# Patient Record
Sex: MALE | Race: WHITE
[De-identification: names, ages, dates, MRNs, and addresses within clinical notes are randomized per-mention and may not be internally consistent; named-entity substitution may affect disease eponyms.]

---

## 2019-09-03 ENCOUNTER — HOSPITAL ENCOUNTER (EMERGENCY)
Dept: HOSPITAL 97 - ER | Age: 49
Discharge: HOME | End: 2019-09-03
Payer: COMMERCIAL

## 2019-09-03 DIAGNOSIS — K42.9: Primary | ICD-10-CM

## 2019-09-03 LAB
ALBUMIN SERPL BCP-MCNC: 3.9 G/DL (ref 3.4–5)
ALP SERPL-CCNC: 91 U/L (ref 45–117)
ALT SERPL W P-5'-P-CCNC: 47 U/L (ref 12–78)
AST SERPL W P-5'-P-CCNC: 23 U/L (ref 15–37)
BUN BLD-MCNC: 17 MG/DL (ref 7–18)
GLUCOSE SERPLBLD-MCNC: 136 MG/DL (ref 74–106)
HCT VFR BLD CALC: 47.5 % (ref 39.6–49)
LIPASE SERPL-CCNC: 99 U/L (ref 73–393)
LYMPHOCYTES # SPEC AUTO: 1.2 K/UL (ref 0.7–4.9)
PMV BLD: 9.4 FL (ref 7.6–11.3)
POTASSIUM SERPL-SCNC: 4.2 MMOL/L (ref 3.5–5.1)
RBC # BLD: 5.25 M/UL (ref 4.33–5.43)

## 2019-09-03 PROCEDURE — 74177 CT ABD & PELVIS W/CONTRAST: CPT

## 2019-09-03 PROCEDURE — 83690 ASSAY OF LIPASE: CPT

## 2019-09-03 PROCEDURE — 85025 COMPLETE CBC W/AUTO DIFF WBC: CPT

## 2019-09-03 PROCEDURE — 99284 EMERGENCY DEPT VISIT MOD MDM: CPT

## 2019-09-03 PROCEDURE — 80048 BASIC METABOLIC PNL TOTAL CA: CPT

## 2019-09-03 PROCEDURE — 96374 THER/PROPH/DIAG INJ IV PUSH: CPT

## 2019-09-03 PROCEDURE — 36415 COLL VENOUS BLD VENIPUNCTURE: CPT

## 2019-09-03 PROCEDURE — 80076 HEPATIC FUNCTION PANEL: CPT

## 2019-09-03 NOTE — ER
Nurse's Notes                                                                                     

 [facility]                                                                 

Name: Danny Duarte                                                                                   

Age: 49 yrs                                                                                       

Sex: Male                                                                                         

: 1970                                                                                   

MRN: [id number]                                                                                   

Arrival Date: 2019                                                                          

Time: 09:44                                                                                       

Account#: [id number]                                                                            

Bed 27                                                                                            

Private MD:                                                                                       

Diagnosis: Umbilical hernia without obstruction or gangrene                                       

                                                                                                  

Presentation:                                                                                     

                                                                                             

09:46 Presenting complaint: Worsening LLQ pain after lifting heavy equipment 1 month ago. Pt  hb  

      was told he has a hernia, unable to follow up with surgeon. Transition of care: patient     

      was not received from another setting of care. Onset of symptoms was 2019. Risk      

      Assessment: Do you want to hurt yourself or someone else? Patient reports no desire to      

      harm self or others. Initial Sepsis Screen: Does the patient meet any 2 criteria? No.       

      Patient's initial sepsis screen is negative. Does the patient have a suspected source       

      of infection? No. Patient's initial sepsis screen is negative. Care prior to arrival:       

      None.                                                                                       

09:46 Method Of Arrival: Ambulatory                                                           hb  

09:46 Acuity: CHRIS 3                                                                           hb  

                                                                                                  

Historical:                                                                                       

- Allergies:                                                                                      

09:49 No Known Allergies;                                                                     hb  

- PMHx:                                                                                           

09:49 arrythmias;                                                                             hb  

- PSHx:                                                                                           

09:49 None;                                                                                   hb  

                                                                                                  

- Immunization history:: Adult Immunizations up to date.                                          

- Social history:: Smoking status: Patient/guardian denies using tobacco.                         

- Ebola Screening: : No symptoms or risks identified at this time.                                

                                                                                                  

                                                                                                  

Screening:                                                                                        

10:17 Abuse screen: Denies threats or abuse. Denies injuries from another. Nutritional        aj1 

      screening: No deficits noted. Tuberculosis screening: No symptoms or risk factors           

      identified.                                                                                 

14:08 Fall Risk None identified.                                                              rv  

                                                                                                  

Assessment:                                                                                       

10:17 General: Appears in no apparent distress. uncomfortable, Behavior is calm, cooperative, aj1 

      appropriate for age. Pain: Complains of pain in left lower quadrant Pain does not           

      radiate. Pain currently is 7 out of 10 on a pain scale. Neuro: Level of Consciousness       

      is awake, alert, obeys commands, Oriented to person, place, time, situation.                

      Cardiovascular: Patient's skin is warm and dry. Respiratory: Airway is patent               

      Respiratory effort is even, unlabored, Respiratory pattern is regular, symmetrical. GI:     

      Abdomen is non-distended, Bowel sounds present X 4 quads. Abd is soft and non tender X      

      4 quads. : No signs and/or symptoms were reported regarding the genitourinary system.     

      EENT: No signs and/or symptoms were reported regarding the EENT system. Derm: No signs      

      and/or symptoms reported regarding the dermatologic system. Skin is pink, warm \T\ dry.     

      normal. Musculoskeletal: No signs and/or symptoms reported regarding the                    

      musculoskeletal system. Circulation, motion, and sensation intact.                          

11:00 Reassessment: Patient appears in no apparent distress at this time. No changes from     aj1 

      previously documented assessment. Patient and/or family updated on plan of care and         

      expected duration. Pain level reassessed. Patient is alert, oriented x 3, equal             

      unlabored respirations, skin warm/dry/pink.                                                 

12:25 Reassessment: Patient appears in no apparent distress at this time. No changes from     aj1 

      previously documented assessment. Patient and/or family updated on plan of care and         

      expected duration. Pain level reassessed. Patient is alert, oriented x 3, equal             

      unlabored respirations, skin warm/dry/pink.                                                 

                                                                                                  

Vital Signs:                                                                                      

09:49  / 98; Pulse 87; Resp 16; Temp 97.8; Pulse Ox 100% on R/A; Weight 108.86 kg;      hb  

      Height 5 ft. 10 in. (177.80 cm); Pain 7/10;                                                 

11:00  / 100; Pulse 66; Resp 18; Pulse Ox 96% on R/A;                                   aj1 

12:25  / 104; Pulse 66; Resp 18; Pulse Ox 97% on R/A;                                   aj1 

13:40  / 104; Pulse 64; Resp 18; Pulse Ox 96% on R/A;                                   aj1 

09:49 Body Mass Index 34.44 (108.86 kg, 177.80 cm)                                              

                                                                                                  

ED Course:                                                                                        

09:44 Patient arrived in ED.                                                                  as  

09:49 Triage completed.                                                                       hb  

09:49 Arm band placed on.                                                                     hb  

09:51 Margot Valdes, RN is Primary Nurse.                                                   aj1 

09:52 Eric Negrete NP is PHCP.                                                           pm1 

09:52 Hudson Piper MD is Attending Physician.                                             pm1 

10:17 Patient has correct armband on for positive identification. Bed in low position. Call   aj1 

      light in reach. Side rails up X 1.                                                          

10:17 No provider procedures requiring assistance completed.                                  aj1 

10:18 Radiology exam delayed due to lab results not completed at this time. (BUN/Creatinine).   

10:46 Initial lab(s) drawn, by me, sent to lab. Inserted saline lock: 20 gauge in right       aj1 

      antecubital area, using aseptic technique. Blood collected.                                 

13:54 Jani Foley MD is Referral Physician.                                                 pm1 

14:08 IV discontinued, intact, bleeding controlled, No redness/swelling at site. Pressure     rv  

      dressing applied.                                                                           

                                                                                                  

Administered Medications:                                                                         

14:05 Drug: Flexeril 10 mg Route: PO;                                                         rv  

14:07 Follow up: Response: Medication administered at discharge.                              rv  

14:05 Drug: TORadol 30 mg Route: IVP; Site: right antecubital;                                rv  

14:07 Follow up: Response: Medication administered at discharge.                              rv  

                                                                                                  

                                                                                                  

Outcome:                                                                                          

13:46 Discharge ordered by MD.                                                                pm1 

14:08 Discharged to home ambulatory.                                                          rv  

14:08 Condition: good                                                                             

14:08 Discharge instructions given to patient, Instructed on discharge instructions, follow       

      up and referral plans. medication usage, Demonstrated understanding of instructions,        

      follow-up care, medications, Prescriptions given X 2.                                       

14:09 Patient left the ED.                                                                    rv  

                                                                                                  

Signatures:                                                                                       

Margot Valdes, RN                     RN   aj1                                                  

Nell Barreto Shannon                                                                                 

Eric Negrete, NP                    NP   pm1                                                  

Thao Castaneda, Umesh Vaughan RN, RN                    RN   rv                                                   

                                                                                                  

**************************************************************************************************

## 2019-09-03 NOTE — EDPHYS
Physician Documentation                                                                           

 [facility]                                                                 

Name: Danny Duarte                                                                                   

Age: 49 yrs                                                                                       

Sex: Male                                                                                         

: 1970                                                                                   

MRN: [id number]                                                                                   

Arrival Date: 2019                                                                          

Time: 09:44                                                                                       

Account#: [id number]                                                                            

Bed 27                                                                                            

Private MD:                                                                                       

[facility] Physician Hudson Piper                                                                      

HPI:                                                                                              

                                                                                             

13:34 This 49 yrs old  Male presents to ER via Ambulatory with complaints of Pain-   pm1 

      Hernia.                                                                                     

13:34 The patient presents with abdominal pain in the left lower quadrant. Onset: The         pm1 

      symptoms/episode began/occurred 1 month(s) ago. The symptoms do not radiate. Associated     

      signs and symptoms: Pertinent negatives: nausea, vomiting, and diarrhea, chest pain,        

      shortness of breath, testicular pain. The symptoms are described as achy. Modifying         

      factors: The symptoms are alleviated by rest. the symptoms are aggravated by Pushing        

      heavy objects. Patient does not wear safety belt during work. Severity of pain: in the      

      emergency department the pain is unchanged. The patient has been recently seen at an        

      urgent care, last week, for similar complaints, Told that he has a hernia.                  

                                                                                                  

Historical:                                                                                       

- Allergies:                                                                                      

09:49 No Known Allergies;                                                                     hb  

- PMHx:                                                                                           

09:49 arrythmias;                                                                             hb  

- PSHx:                                                                                           

09:49 None;                                                                                   hb  

                                                                                                  

- Immunization history:: Adult Immunizations up to date.                                          

- Social history:: Smoking status: Patient/guardian denies using tobacco.                         

- Ebola Screening: : No symptoms or risks identified at this time.                                

                                                                                                  

                                                                                                  

ROS:                                                                                              

13:34 Constitutional: Negative for fever, chills, and weight loss, Eyes: Negative for injury, pm1 

      pain, redness, and discharge, ENT: Negative for injury, pain, and discharge, Neck:          

      Negative for injury, pain, and swelling, Cardiovascular: Negative for chest pain,           

      palpitations, and edema, Respiratory: Negative for shortness of breath, cough,              

      wheezing, and pleuritic chest pain, Back: Negative for injury and pain.                     

13:34 : Negative for injury, bleeding, discharge, and swelling, MS/Extremity: Negative for      

      injury and deformity, Skin: Negative for injury, rash, and discoloration, Neuro:            

      Negative for headache, weakness, numbness, tingling, and seizure.                           

13:34 Abdomen/GI: Positive for abdominal pain, Negative for nausea, vomiting, and diarrhea,       

      constipation.                                                                               

                                                                                                  

Exam:                                                                                             

10:30 Constitutional:  This is a well developed, well nourished patient who is awake, alert,  pm1 

      and in no acute distress. Head/Face:  Normocephalic, atraumatic. Eyes:  Pupils equal        

      round and reactive to light, extra-ocular motions intact.  Lids and lashes normal.          

      Conjunctiva and sclera are non-icteric and not injected.  Cornea within normal limits.      

      Periorbital areas with no swelling, redness, or edema. ENT:  Nares patent. No nasal         

      discharge, no septal abnormalities noted.  Tympanic membranes are normal and external       

      auditory canals are clear.  Oropharynx with no redness, swelling, or masses, exudates,      

      or evidence of obstruction, uvula midline.  Mucous membranes moist. Neck:  Trachea          

      midline, no thyromegaly or masses palpated, and no cervical lymphadenopathy.  Supple,       

      full range of motion without nuchal rigidity, or vertebral point tenderness.  No            

      Meningismus. Chest/axilla:  Normal chest wall appearance and motion.  Nontender with no     

      deformity.  No lesions are appreciated. Cardiovascular:  Regular rate and rhythm with a     

      normal S1 and S2.  No gallops, murmurs, or rubs.  Normal PMI, no JVD.  No pulse             

      deficits. Respiratory:  Lungs have equal breath sounds bilaterally, clear to                

      auscultation and percussion.  No rales, rhonchi or wheezes noted.  No increased work of     

      breathing, no retractions or nasal flaring.                                                 

10:30 Back:  No spinal tenderness.  No costovertebral tenderness.  Full range of motion.          

      Skin:  Warm, dry with normal turgor.  Normal color with no rashes, no lesions, and no       

      evidence of cellulitis. MS/ Extremity:  Pulses equal, no cyanosis.  Neurovascular           

      intact.  Full, normal range of motion.                                                      

10:30 Abdomen/GI: Inspection: obese Bowel sounds: normal, Palpation: abdomen is soft and          

      non-tender, mass, is not appreciated, rebound tenderness, is not appreciated, Hernia:       

      not appreciated.                                                                            

10:30 Neuro: Orientation: is normal, Motor: is normal, moves all fours.                           

                                                                                                  

Vital Signs:                                                                                      

09:49  / 98; Pulse 87; Resp 16; Temp 97.8; Pulse Ox 100% on R/A; Weight 108.86 kg;      hb  

      Height 5 ft. 10 in. (177.80 cm); Pain 7/10;                                                 

11:00  / 100; Pulse 66; Resp 18; Pulse Ox 96% on R/A;                                   aj1 

12:25  / 104; Pulse 66; Resp 18; Pulse Ox 97% on R/A;                                   aj1 

13:40  / 104; Pulse 64; Resp 18; Pulse Ox 96% on R/A;                                   aj1 

09:49 Body Mass Index 34.44 (108.86 kg, 177.80 cm)                                            hb  

                                                                                                  

MDM:                                                                                              

09:52 Patient medically screened.                                                             pm1 

13:45 Data reviewed: vital signs. Data interpreted: Pulse oximetry: on room air is 96 %.      pm1 

      Interpretation: normal. Counseling: I had a detailed discussion with the patient and/or     

      guardian regarding: the historical points, exam findings, and any diagnostic results        

      supporting the discharge/admit diagnosis, lab results, radiology results, the need for      

      outpatient follow up, for definitive care, a general surgeon, to return to the              

      emergency department if symptoms worsen or persist or if there are any questions or         

      concerns that arise at home.                                                                

                                                                                                  

                                                                                             

10:13 Order name: Basic Metabolic Panel; Complete Time: 11:25                                 pm1 

                                                                                             

10:13 Order name: CBC with Diff; Complete Time: 11:04                                         pm                                                                                             

10:13 Order name: Creatinine for Radiology; Complete Time: 11:25                              pm1 

                                                                                             

10:13 Order name: Hepatic Function; Complete Time: 11:25                                      pm1 

                                                                                             

10:13 Order name: Lipase; Complete Time: 11:25                                                pm1 

                                                                                             

10:13 Order name: CT Abd/Pelvis - IV Contrast Only                                            pm1 

                                                                                             

10:13 Order name: IV Saline Lock; Complete Time: 10:45                                        pm1 

                                                                                             

10:13 Order name: Labs collected and sent; Complete Time: 10:45                               pm1 

                                                                                                  

Administered Medications:                                                                         

14:05 Drug: Flexeril 10 mg Route: PO;                                                         rv  

14:07 Follow up: Response: Medication administered at discharge.                              rv  

14:05 Drug: TORadol 30 mg Route: IVP; Site: right antecubital;                                rv  

14:07 Follow up: Response: Medication administered at discharge.                              rv  

                                                                                                  

                                                                                                  

Disposition:                                                                                      

15:35 Co-signature as Attending Physician, Hudson Piper MD I agree with the assessment and  janie 

      plan of care.                                                                               

                                                                                                  

Disposition:                                                                                      

19 13:46 Discharged to Home. Impression: Umbilical hernia without obstruction or            

  gangrene.                                                                                       

- Condition is Stable.                                                                            

- Discharge Instructions: Hernia, Adult, Muscle Strain.                                           

- Prescriptions for Naprosyn 500 mg Oral Tablet - take 1 tablet by ORAL route 2 times             

  per day take with food; 30 tablet. Cyclobenzaprine 10 mg Oral Tablet - take 1 tablet            

  by ORAL route every 8 hours As needed; 30 tablet.                                               

- Medication Reconciliation Form, Thank You Letter, Antibiotic Education, Prescription            

  Opioid Use form.                                                                                

- Work release form (19 08:28).                                                         em1 

- Follow up: Emergency Department; When: As needed; Reason: Worsening of condition.               

  Follow up: Private Physician; When: 2 - 3 days; Reason: Recheck today's complaints,             

  Continuance of care, Re-evaluation by your physician. Follow up: Jani Foley MD;              

  When: 2 - 3 days; Reason: Recheck today's complaints, Continuance of care,                      

  Re-evaluation by your physician.                                                                

- Problem is new.                                                                                 

- Symptoms have improved.                                                                         

                                                                                                  

                                                                                                  

                                                                                                  

Signatures:                                                                                       

Dispatcher [employer]                           EDMS                                                 

Hudson Piper MD MD cha Marinas, Patrick, NP                    NP   pm1                                                  

Thao Castaneda RN                     RN   Umesh Moreno RN RN rv Martinez, Eric                               em1                                                  

                                                                                                  

Corrections: (The following items were deleted from the chart)                                    

13:54 13:46 2019 13:46 Discharged to Home. Impression: Umbilical hernia without         pm1 

      obstruction or gangrene. Condition is Stable. Forms are Medication Reconciliation Form,     

      Thank You Letter, Antibiotic Education, Prescription Opioid Use. Follow up: Emergency       

      Department; When: As needed; Reason: Worsening of condition. Follow up: Private             

      Physician; When: 2 - 3 days; Reason: Recheck today's complaints, Continuance of care,       

      Re-evaluation by your physician. Problem is new. Symptoms have improved. pm1                

14:09 13:54 2019 13:46 Discharged to Home. Impression: Umbilical hernia without         rv  

      obstruction or gangrene. Condition is Stable. Discharge Instructions: Hernia, Adult,        

      Muscle Strain. Forms are Medication Reconciliation Form, Thank You Letter, Antibiotic       

      Education, Prescription Opioid Use. Follow up: Emergency Department; When: As needed;       

      Reason: Worsening of condition. Follow up: Private Physician; When: 2 - 3 days; Reason:     

      Recheck today's complaints, Continuance of care, Re-evaluation by your physician.           

      Follow up: Jani Foley; When: 2 - 3 days; Reason: Recheck today's complaints,               

      Continuance of care, Re-evaluation by your physician. Problem is new. Symptoms have         

      improved. pm1                                                                               

                                                                                                  

**************************************************************************************************

## 2019-09-03 NOTE — RAD REPORT
EXAM DESCRIPTION:  CT - Abdomen   Pelvis W Contrast - 9/3/2019 11:37 am

 

CLINICAL HISTORY:  Abdominal pain/left upper quadrant pain

 

COMPARISON:  none.

 

TECHNIQUE:  Computed axial tomography of the abdomen pelvis was obtained. 100 cc Isovue-300 was admin
istered intravenously. Oral contrast was not requested which limits evaluation of bowel.

 

All CT scans are performed using dose optimization technique as appropriate and may include automated
 exposure control or mA/KV adjustment according to patient size.

 

FINDINGS:  Fatty liver. Spleen, pancreas, adrenal and kidneys appear unremarkable.

 

There is no evidence of diverticulitis. Normal appendix

 

Umbilical hernia contains fat. Mild stranding is present within the fat. This may indicate mild infla
mmation. Neck measures 23 millimeters.

 

Small right inguinal hernia

 

IMPRESSION:  Umbilical hernia

## 2020-12-03 NOTE — RAD REPORT
EXAM DESCRIPTION:  RAD - Knee Right 3 View - 12/3/2020 10:11 am

 

CLINICAL HISTORY:  PAIN

 

COMPARISON:  <Comparisons>

 

FINDINGS:  No fracture, dislocation or periosteal reaction.No joint effusion seen. No joint space colleen
rowing. No foreign body or other soft tissue abnormality.

 

 

IMPRESSION:  Negative right knee.

 

Clinical concerns for internal derangement or occult bony injury could be further assessed with MR im
aging.

## 2020-12-03 NOTE — EDPHYS
Physician Documentation                                                                           

 [facility]                                                                 

Name: Danny Duarte                                                                                   

Age: 50 yrs                                                                                       

Sex: Male                                                                                         

: 1970                                                                                   

MRN: [id number]                                                                                   

Arrival Date: 2020                                                                          

Time: 09:22                                                                                       

Account#: [id number]                                                                            

Bed 14                                                                                            

Private MD:                                                                                       

ED Physician Jason Worley                                                                       

HPI:                                                                                              

                                                                                             

09:40 This 50 yrs old  Male presents to ER via Ambulatory with complaints of Hip     cp  

      Problem.                                                                                    

09:40 The patient presents with pain, that is chronic.                                        cp  

09:40 The complaints affect the right knee and right hip. Context: the patient can fully bear cp  

      weight, the patient is able to ambulate, with mild difficulty.                              

09:40 Patient reports chronic right SI joint pain that became worse 2-3 days ago after        cp  

      installing new well pump. Now having pain of right hip and right knee.                      

                                                                                                  

Historical:                                                                                       

- Allergies:                                                                                      

09:30 No Known Allergies;                                                                     aa5 

- PMHx:                                                                                           

09:30 Heart arrhythmias;                                                                      aa5 

- PSHx:                                                                                           

09:30 None;                                                                                   aa5 

                                                                                                  

- Immunization history:: Adult Immunizations unknown.                                             

- Social history:: Smoking status: Patient denies any tobacco usage or history of.                

                                                                                                  

                                                                                                  

ROS:                                                                                              

09:45 MS/extremity: Positive for pain, of the right hip and right knee, Negative for injury   cp  

      or acute deformity, decreased range of motion, paresthesias.                                

09:45 Constitutional: Negative for body aches, chills, fever.                                 cp  

09:45 Cardiovascular: Negative for chest pain, palpitations.                                      

09:45 Respiratory: Negative for cough, shortness of breath, wheezing.                             

09:45 Abdomen/GI: Negative for abdominal pain, nausea, vomiting, and diarrhea.                    

09:45 Skin: Negative for rash.                                                                    

09:45 Neuro: Negative for numbness, tingling, weakness.                                           

09:45 All other systems are negative.                                                             

                                                                                                  

Exam:                                                                                             

09:47 Constitutional: The patient appears in no acute distress, alert, awake, non-toxic, well cp  

      developed, well nourished.                                                                  

09:47 Head/Face:  Normocephalic, atraumatic.                                                  cp  

09:47 Neck: ROM/movement: is normal, is supple, without pain, no range of motions limitations.    

09:47 Chest/axilla: Inspection: normal, Palpation: is normal, no crepitus, no tenderness.         

09:47 Cardiovascular: Rate: normal.                                                               

09:47 Respiratory: the patient does not display signs of respiratory distress,  Respirations:     

      normal, no use of accessory muscles, no retractions.                                        

09:47 Abdomen/GI: Inspection: abdomen appears normal, Palpation: abdomen is soft and              

      non-tender, in all quadrants, voluntary guarding, is not appreciated, involuntary           

      guarding, is not appreciated.                                                               

09:47 Back: pain, that is mild, of the  right low back, vertebral tenderness, is not              

      appreciated.                                                                                

09:47 Musculoskeletal/extremity: Joints: All joints are normal except the  right hip displays     

      painful range of motion, tenderness, the  right knee displays painful range of motion,      

      tenderness, Weight bearing: able to fully bear weight.                                      

09:47 Skin: no rash present.                                                                      

09:47 Neuro: Orientation: to person, place \T\ time. Mentation: is normal, Motor: moves all       

      fours, strength is normal, Sensation: is normal, Gait: is steady.                           

                                                                                                  

Vital Signs:                                                                                      

09:29  / 99; Pulse 87; Resp 16 S; Temp 98.5(O); Pulse Ox 98% on R/A; Weight 113.4 kg    aa5 

      (R); Height 5 ft. 10 in. (177.80 cm) (R); Pain 7/10;                                        

10:46  / 88; Pulse 81; Resp 15 S; Pulse Ox 99% on R/A;                                  ca1 

09:29 Body Mass Index 35.87 (113.40 kg, 177.80 cm)                                            aa5 

                                                                                                  

MDM:                                                                                              

09:37 Patient medically screened.                                                             cp  

09:44 ED course: No active RXs for narcotic pain meds according to [place] prescription monitor cp  

      website.                                                                                    

10:20 Test interpretation: by ED physician or midlevel provider: xray of pelvis negative for  cp  

      fracture, xrays of right hip negative for fracture and xrays of right knee negative for     

      fracture.                                                                                   

10:35 Data reviewed: vital signs, nurses notes, radiologic studies, plain films.              cp  

10:35 Counseling: I had a detailed discussion with the patient and/or guardian regarding: the cp  

      historical points, exam findings, and any diagnostic results supporting the                 

      discharge/admit diagnosis, radiology results, the need for outpatient follow up, a          

      orthopedic surgeon, to return to the emergency department if symptoms worsen or persist     

      or if there are any questions or concerns that arise at home.                               

                                                                                                  

                                                                                             

09:38 Order name: XRAY Pelvis; Complete Time: 10:29                                           cp  

1203                                                                                             

10:29 Interpretation: Report reviewed.                                                        cp  

                                                                                             

09:38 Order name: XRAY Hip RIGHT 2 view; Complete Time: 10:29                                 cp  

                                                                                             

10:29 Interpretation: Report reviewed.                                                        cp  

                                                                                             

09:38 Order name: XRAY Knee RIGHT 3 view; Complete Time: 10:29                                cp  

                                                                                             

10:30 Interpretation: Report reviewed.                                                        cp  

                                                                                                  

Administered Medications:                                                                         

No medications were administered                                                                  

                                                                                                  

                                                                                                  

Disposition:                                                                                      

10:45 Chart complete.                                                                         cp  

12:05 I agree with the assessment and plan of care.                                           kdr 

                                                                                                  

Disposition:                                                                                      

20 10:36 Discharged to Home. Impression: Pain in right hip, Pain in right knee.             

- Condition is Stable.                                                                            

- Discharge Instructions: Knee Pain, Hip Pain, Sacroiliac Joint Dysfunction.                      

- Prescriptions for Cyclobenzaprine 10 mg Oral Tablet - take 1 tablet by ORAL route               

  every 8 hours As needed; 20 tablet. Mobic 7.5 mg Oral Tablet - take 1 tablet by ORAL            

  route once daily take with food; 30 tablet. Tramadol 50 mg Oral Tablet - take 1                 

  tablet by ORAL route every 8 hours as needed; 12 tablet.                                        

- Medication Reconciliation Form, Thank You Letter, Antibiotic Education, Prescription            

  Opioid Use form.                                                                                

- Follow up: Bobby Andujar MD; When: 2 - 3 days; Reason: Recheck today's                     

  complaints.                                                                                     

- Problem is an ongoing problem.                                                                  

- Symptoms have improved.                                                                         

                                                                                                  

                                                                                                  

                                                                                                  

Signatures:                                                                                       

Dispatcher [employer]                           EDMS                                                 

Jason Worley MD MD   [facility]                                                  

Akanksha Rhodes RN                     RN   aa5                                                  

Hudson Lizama PA PA   cp                                                   

Chel Montoya RN                        RN   ca1                                                  

                                                                                                  

Corrections: (The following items were deleted from the chart)                                    

10:50 10:36 2020 10:36 Discharged to Home. Impression: Pain in right hip; Pain in right ca1 

      knee. Condition is Stable. Forms are Medication Reconciliation Form, Thank You Letter,      

      Antibiotic Education, Prescription Opioid Use. Follow up: Bobby Andujar; When: 2 - 3     

      days; Reason: Recheck today's complaints. Problem is an ongoing problem. Symptoms have      

      improved. cp                                                                                

                                                                                                  

**************************************************************************************************

## 2020-12-03 NOTE — RAD REPORT
EXAM DESCRIPTION:  RAD - Pelvis - 12/3/2020 10:11 am

 

CLINICAL HISTORY:  PAIN, no recent trauma

 

COMPARISON:  No comparisons

 

TECHNIQUE:  AP imaging of the pelvis was obtained.

 

FINDINGS:  No fracture of the bony pelvis. No fracture, dislocation or other acute hip joint finding.
 No significant SI joint findings. Multiple phleboliths seen in the lower pelvis.

 

 

No soft tissue abnormality.

 

IMPRESSION:  Negative pelvis for acute or significant findings.

## 2020-12-03 NOTE — RAD REPORT
EXAM DESCRIPTION:  RAD - Hip Right 2 View - 12/3/2020 10:11 am

 

CLINICAL HISTORY:  PAIN, nontraumatic

 

COMPARISON:  No comparisons

 

FINDINGS:  AP and frog-leg views of the right hip were obtained.

 

There is no fracture or dislocation. No AVN or focal head abnormality. No acute or destructive bony p
rocess seen.

 

IMPRESSION:  Negative right hip examination for acute or significant findings.

## 2021-04-19 NOTE — RAD REPORT
EXAM DESCRIPTION:  Jyoti Single View4/19/2021 6:29 pm

 

CLINICAL HISTORY:  Chest pain

 

COMPARISON:  2017

 

FINDINGS:   The lungs appear clear of acute infiltrate. The heart is normal size

 

IMPRESSION:   No acute abnormalities displayed

## 2021-04-19 NOTE — EDPHYS
Physician Documentation                                                                           

 [facility]                                                                 

Name: Danny Duarte                                                                                   

Age: 51 yrs                                                                                       

Sex: Male                                                                                         

: 1970                                                                                   

MRN: [id number]                                                                                   

Arrival Date: 2021                                                                          

Time: 14:18                                                                                       

Account#: [id number]                                                                            

Bed 26                                                                                            

Private MD:                                                                                       

ED Physician Hudson Piper                                                                      

HPI:                                                                                              

                                                                                             

19:48 This 51 yrs old  Male presents to ER via Ambulatory with complaints of         janie 

      Shortness Of Breath, Chest Pain.                                                            

19:48 The patient has shortness of breath at rest, with light activity. Onset: The            janie 

      symptoms/episode began/occurred last night. Duration: The symptoms are continuous, and      

      are steadily getting worse. The patient's shortness of breath has no apparent modifying     

      factors. Associated signs and symptoms: Pertinent positives: chest pain, non-productive     

      cough. Severity of symptoms: At their worst the symptoms were mild moderate in the          

      emergency department the symptoms have improved mildly. The patient has experienced         

      similar episodes in the past, a few times.                                                  

                                                                                                  

Historical:                                                                                       

- Allergies:                                                                                      

14:28 No Known Allergies;                                                                     ll1 

- PMHx:                                                                                           

14:28 arrythmias; Heart Arrhythmias;                                                          ll1 

- PSHx:                                                                                           

14:28 None;                                                                                   ll1 

                                                                                                  

- Immunization history:: Client reports receiving the 1st dose of the Covid vaccine,              

  Flu vaccine is up to date.                                                                      

- Social history:: Smoking status: Patient denies any tobacco usage or history of.                

                                                                                                  

                                                                                                  

ROS:                                                                                              

19:49 Constitutional: Negative for fever, chills, and weight loss, Eyes: Negative for injury, janie 

      pain, redness, and discharge, ENT: Negative for injury, pain, and discharge, Neck:          

      Negative for injury, pain, and swelling, Respiratory: Negative for shortness of breath,     

      cough, wheezing, and pleuritic chest pain, Abdomen/GI: Negative for abdominal pain,         

      nausea, vomiting, diarrhea, and constipation, Back: Negative for injury and pain, :       

      Negative for injury, bleeding, discharge, and swelling, MS/Extremity: Negative for          

      injury and deformity, Skin: Negative for injury, rash, and discoloration, Neuro:            

      Negative for headache, weakness, numbness, tingling, and seizure, Psych: Negative for       

      depression, anxiety, suicide ideation, homicidal ideation, and hallucinations,              

      Allergy/Immunology: Negative for hives, rash, and allergies, Endocrine: Negative for        

      neck swelling, polydipsia, polyuria, polyphagia, and marked weight changes,                 

      Hematologic/Lymphatic: Negative for swollen nodes, abnormal bleeding, and unusual           

      bruising.                                                                                   

19:49 Cardiovascular: Positive for chest pain, of the chest.                                      

                                                                                                  

Exam:                                                                                             

19:49 Constitutional:  This is a well developed, well nourished patient who is awake, alert,  janie 

      and in no acute distress. Head/Face:  Normocephalic, atraumatic. Eyes:  Pupils equal        

      round and reactive to light, extra-ocular motions intact.  Lids and lashes normal.          

      Conjunctiva and sclera are non-icteric and not injected.  Cornea within normal limits.      

      Periorbital areas with no swelling, redness, or edema. ENT:  Nares patent. No nasal         

      discharge, no septal abnormalities noted.  Tympanic membranes are normal and external       

      auditory canals are clear.  Oropharynx with no redness, swelling, or masses, exudates,      

      or evidence of obstruction, uvula midline.  Mucous membranes moist. Neck:  Trachea          

      midline, no thyromegaly or masses palpated, and no cervical lymphadenopathy.  Supple,       

      full range of motion without nuchal rigidity, or vertebral point tenderness.  No            

      Meningismus. Chest/axilla:  Normal chest wall appearance and motion.  Nontender with no     

      deformity.  No lesions are appreciated. Cardiovascular:  Regular rate and rhythm with a     

      normal S1 and S2.  No gallops, murmurs, or rubs.  Normal PMI, no JVD.  No pulse             

      deficits. Respiratory:  Lungs have equal breath sounds bilaterally, clear to                

      auscultation and percussion.  No rales, rhonchi or wheezes noted.  No increased work of     

      breathing, no retractions or nasal flaring. Abdomen/GI:  Soft, non-tender, with normal      

      bowel sounds.  No distension or tympany.  No guarding or rebound.  No evidence of           

      tenderness throughout. Back:  No spinal tenderness.  No costovertebral tenderness.          

      Full range of motion. Male :  Normal genitalia with no discharge or lesions. Skin:        

      Warm, dry with normal turgor.  Normal color with no rashes, no lesions, and no evidence     

      of cellulitis. MS/ Extremity:  Pulses equal, no cyanosis.  Neurovascular intact.  Full,     

      normal range of motion. Neuro:  Awake and alert, GCS 15, oriented to person, place,         

      time, and situation.  Cranial nerves II-XII grossly intact.  Motor strength 5/5 in all      

      extremities.  Sensory grossly intact.  Cerebellar exam normal.  Normal gait. Psych:         

      Awake, alert, with orientation to person, place and time.  Behavior, mood, and affect       

      are within normal limits.                                                                   

19:49 Musculoskeletal/extremity: ROM: no acute changes, intact in all extremities,                

      Circulation is intact in all extremities. Pulses: Sensation intact. Compartment             

      Syndrome exam of affected extremity: is normal. DVT Exam: No signs of deep vein             

      thrombosis. no pain, no swelling, no tenderness, negative Homans' sign noted on exam,       

      no appreciated bluish discoloration, no erythema, no increased warmth.                      

                                                                                                  

Vital Signs:                                                                                      

14:26  / 119; Pulse 87; Resp 18; Temp 98.6; Pulse Ox 97% ; Weight 113.4 kg; Height 5    ll1 

      ft. 10 in. (177.80 cm); Pain 4/10;                                                          

20:19  / 109; Pulse 68; Resp 16; Pulse Ox 99% ;                                         sf  

20:40  / 111; Pulse 66; Resp 16; Pulse Ox 99% ;                                         sf  

21:00  / 99; Pulse 69; Resp 16; Pulse Ox 100% ;                                         sf  

21:20  / 104; Pulse 62; Resp 16; Pulse Ox 100% ;                                        sf  

21:40  / 115; Pulse 62; Resp 16; Pulse Ox 99% ;                                         sf  

22:00  / 120; Pulse 65; Resp 16; Pulse Ox 99% ;                                         sf  

14:26 Body Mass Index 35.87 (113.40 kg, 177.80 cm)                                            ll1 

                                                                                                  

MDM:                                                                                              

19:24 Patient medically screened.                                                             janie 

19:51 Differential diagnosis: Bronchitis CHF exacerbation, abnormal EKG, acute pericarditis,  janie 

      anxiety, coronary artery disease chest wall pain, congestive heart failure gastritis,       

      gastroesophageal reflux disease (GERD), hiatal hernia, pancreatitis, pleurisy,              

      pneumonia, pulmonary embolus, stable angina, unstable angina, pneumonia, pulmonary          

      edema, Pulmonary Embolism Unstable Angina. Antibiotic administration: Not indicated,        

      the patient does not have an appreciated infiltrate. HEART Score: History: Slightly         

      Suspicious (0), ECG: Non specific repolarization disturbance / LBTB / PM (1), Age: > 45     

      and < 65 years (1), Risk Factors: 1 or 2 risk factors (1), [Hypertension] [Obesity]         

      Troponin: < or = 1 x Normal Limit (0). The patient was given aspirin in the Emergency       

      Department. The patient's Wells Deep Vein Thrombosis Score was calculated as follows:       

      Total Score: 0. This patient was found to be at low risk for a deep vein thrombosis by      

      using the Well's assessment criteria Total Score: 0-2 Pts- Low Risk. The patient's          

      pulmonary embolism risk score was calculated as follows: Total Score: 0-2 points. This      

      patient was found to be at low risk for a pulmonary embolism by using the Well's            

      assessment criteria Total Score: 0-2 points. This patient was found to be at low risk       

      for a pulmonary embolism by using the Well's assessment criteria. SUAD Risk Score: 1 -      

      ASA use in past 7 days, TOTAL SCORE = 1. Immunization status: Influenza vaccine: Data       

      reviewed: vital signs, nurses notes, lab test result(s), EKG, radiologic studies, plain     

      films. Data interpreted: Cardiac monitor: rate is 87 beats/min, rhythm is regular,          

      Pulse oximetry: on room air is 97 %. Test interpretation: by ED physician or midlevel       

      provider: ECG, plain radiologic studies.                                                    

                                                                                                  

                                                                                             

17:55 Order name: Blood Culture Adult (2)                                                                                                                                                  

17:55 Order name: BMP; Complete Time: 20:24                                                                                                                                                

17:55 Order name: CBC with Diff                                                                                                                                                            

17:55 Order name: Ckmb; Complete Time: 20:24                                                                                                                                               

17:55 Order name: CPK; Complete Time: 20:24                                                                                                                                                

17:55 Order name: D-Dimer; Complete Time: 20:24                                                                                                                                            

17:55 Order name: Hepatic Function; Complete Time: 20:24                                                                                                                                   

17:55 Order name: Lipase; Complete Time: 20:24                                                                                                                                             

17:55 Order name: Magnesium; Complete Time: 20:24                                                                                                                                          

17:55 Order name: NT PRO-BNP; Complete Time: 20:24                                                                                                                                         

17:55 Order name: PT-INR; Complete Time: 20:24                                                                                                                                             

17:55 Order name: Ptt, Activated; Complete Time: 20:24                                                                                                                                     

17:55 Order name: Troponin (emerg Dept Use Only); Complete Time: 20:24                                                                                                                     

17:56 Order name: Blood Culture                                                               [facility]

                                                                                             

20:25 Order name: CBC Smear Scan                                                              [facility]

                                                                                             

21:11 Order name: SARS-COV-2 RT PCR; Complete Time: 21:39                                     [facility]

                                                                                             

22:05 Order name: Urine Dipstick-Ancillary                                                    [facility]

                                                                                             

01:20 Order name: Troponin I                                                                  [facility]

                                                                                             

01:20 Order name: T4 Free                                                                     [facility]

                                                                                             

01:20 Order name: Thyroid Stimulating Hormone                                                 [facility]

                                                                                             

05:01 Order name: CBC with Automated Diff                                                     [facility]

                                                                                             

05:23 Order name: Hemoglobin A1c                                                              [facility]

                                                                                             

05:25 Order name: Troponin I                                                                  [facility]

                                                                                             

05:46 Order name: Comprehensive Metabolic Panel                                               [facility]

                                                                                             

05:46 Order name: Phosphorus                                                                  [facility]

                                                                                             

05:46 Order name: Lipid Profile                                                               [facility]

                                                                                             

05:46 Order name: Magnesium                                                                   [facility]

                                                                                             

05:57 Order name: LDL, Direct                                                                 [facility]

                                                                                             

06:07 Order name: Urinalysis                                                                  [facility]

                                                                                             

17:55 Order name: XRAY CXR (1 view); Complete Time: 20:24                                     [facility] 

                                                                                             

17:55 Order name: EKG; Complete Time: 17:57                                                   [facility] 

                                                                                             

17:55 Order name: Cardiac monitoring; Complete Time: 20:07                                    [facility] 

                                                                                             

17:55 Order name: EKG - Nurse/Tech; Complete Time: 19:25                                      [facility] 

                                                                                             

17:55 Order name: IV Saline Lock; Complete Time: 20:07                                        [facility] 

                                                                                             

17:55 Order name: Labs collected and sent; Complete Time: 20:07                               [facility] 

                                                                                             

17:55 Order name: O2 Per Protocol; Complete Time: 20:07                                       [facility] 

                                                                                             

17:55 Order name: O2 Sat Monitoring; Complete Time: 20:07                                     [facility] 

                                                                                             

19:26 Order name: Urine Dipstick-Ancillary (obtain specimen); Complete Time: 22:05            [facility] 

                                                                                             

21:20 Order name: CONS Physician Consult                                                      [facility]

                                                                                                  

Administered Medications:                                                                         

20:15 Drug: NS 0.9% 1000 ml Route: IV; Rate: 125 ml/hr; Site: right antecubital;              sf  

22:23 Follow up: IV Status: Infusion continued upon admission                                 sf  

20:23 Drug: Aspirin Chewable Tablet 324 mg Route: PO;                                         sf  

21:34 Follow up: Response: No adverse reaction                                                sf  

20:23 Drug: Pepcid (famotidine) 20 mg Route: IVP; Site: right antecubital;                    sf  

21:33 Follow up: Response: No adverse reaction                                                sf  

20:24 Drug: Lopressor (metoprolol TARTRATE) 50 mg Route: PO;                                  sf  

21:34 Follow up: Response: No adverse reaction                                                sf  

20:26 Drug: Lovenox (enoxaparin) 100 mg Route: Sub-Q; Site: right lower abdomen;              sf  

21:34 Follow up: Response: No adverse reaction                                                sf  

21:33 Drug: Norvasc (amlodipine) 10 mg Route: PO;                                             sf  

                                                                                                  

                                                                                                  

Disposition:                                                                                      

21 20:31 Hospitalization ordered by Thompson Santana for Observation. Preliminary             

  diagnosis are Dyspnea, Chest pain, unspecified, Essential (primary)                             

  hypertension.                                                                                   

- Bed requested for [facility] ER HOLD.                                                                 

- Status is Observation.                                                                      tw2 

- Condition is Stable.                                                                            

- Problem is new.                                                                                 

- Symptoms have improved.                                                                         

                                                                                                  

                                                                                                  

                                                                                                  

Signatures:                                                                                       

Dispatcher [employer]                           EDMS                                                 

Hudson Piper MD MD cha Lasagna, Tonya, RN                      RN   tl1                                                  

Marlene España RN                          RN   tw2                                                  

Kobe Mullen MD MD   4                                                  

Tisha Caro RN                       RN   1                                                  

Bobby Story RN                 RN   sf                                                   

                                                                                                  

Corrections: (The following items were deleted from the chart)                                    

20:13 17:57 CORONAVIRUS+MR.LAB.SCOT ordered. [facility]

                                                                                             

02:24  20:31 Hospitalization Ordered by Thompson Santana MD for Observation. Preliminary    tl1 

      diagnosis is Dyspnea; Chest pain, unspecified; Essential (primary) hypertension. Bed        

      requested for Telemetry/MedSurg (observation). Status is Observation. Condition is          

      Stable. Problem is new. Symptoms have improved. [facility]                                         

                                                                                             

09:33 02:24 2021 20:31 Hospitalization Ordered by Thompson Santana MD for Observation.     tw2 

      Preliminary diagnosis is Dyspnea; Chest pain, unspecified; Essential (primary)              

      hypertension. Bed requested for [facility] ER HOLD. Status is Observation. Condition is           

      Stable. Problem is new. Symptoms have improved. tl1                                         

                                                                                                  

**************************************************************************************************

## 2021-04-19 NOTE — ER
Nurse's Notes                                                                                     

 [facility]                                                                 

Name: Danny Duarte                                                                                   

Age: 51 yrs                                                                                       

Sex: Male                                                                                         

: 1970                                                                                   

MRN: [id number]                                                                                   

Arrival Date: 2021                                                                          

Time: 14:18                                                                                       

Account#: [id number]                                                                            

Bed 26                                                                                            

Private MD:                                                                                       

Diagnosis: Dyspnea;Chest pain, unspecified;Essential (primary) hypertension                       

                                                                                                  

Presentation:                                                                                     

                                                                                             

14:26 Chief complaint: Patient states: CP and SOB since last night. Coronavirus screen:       ll1 

      Client denies travel out of the [place] in the last 14 days. At this time, the client does     

      not indicate any symptoms associated with coronavirus-19. Ebola Screen: Patient denies      

      travel to an Ebola-affected area in the 21 days before illness onset. Initial Sepsis        

      Screen: Does the patient meet any 2 criteria? No. Patient's initial sepsis screen is        

      negative. Does the patient have a suspected source of infection? No. Patient's initial      

      sepsis screen is negative. Risk Assessment: Do you want to hurt yourself or someone         

      else? Patient reports no desire to harm self or others. Onset of symptoms was 2021.                                                                                       

14:26 Method Of Arrival: Ambulatory                                                           [facility] 

14:26 Acuity: CHRIS 3                                                                           ll1 

                                                                                                  

Historical:                                                                                       

- Allergies:                                                                                      

14:28 No Known Allergies;                                                                     ll1 

- PMHx:                                                                                           

14:28 arrythmias; Heart Arrhythmias;                                                          ll1 

- PSHx:                                                                                           

14:28 None;                                                                                   ll1 

                                                                                                  

- Immunization history:: Client reports receiving the 1st dose of the Covid vaccine,              

  Flu vaccine is up to date.                                                                      

- Social history:: Smoking status: Patient denies any tobacco usage or history of.                

                                                                                                  

                                                                                                  

Screenin:10 Abuse screen: Denies threats or abuse. Denies injuries from another. Nutritional        sf  

      screening: No deficits noted. Tuberculosis screening: No symptoms or risk factors           

      identified. Fall Risk None identified. IV access (20 points). Total Mcgovern Fall Scale        

      indicates No Risk (0-24 pts).                                                               

                                                                                                  

Assessment:                                                                                       

20:10 General: Appears in no apparent distress. comfortable, Behavior is calm, cooperative.   sf  

      Pain: Complains of pain in chest. Neuro: No deficits noted. Level of Consciousness is       

      awake, alert, Oriented to person, place, time, situation. Cardiovascular: Reports chest     

      pain, shortness of breath, Denies diaphoresis, lightheadedness, Patient's skin is warm      

      and dry. Rhythm is sinus rhythm. Respiratory: Reports shortness of breath Airway is         

      patent Respiratory effort is even, unlabored, Respiratory pattern is regular,               

      symmetrical, Breath sounds are clear Denies cough. GI: No deficits noted. No signs          

      and/or symptoms were reported involving the gastrointestinal system. : No deficits        

      noted. No signs and/or symptoms were reported regarding the genitourinary system. EENT:     

      No deficits noted. No signs and/or symptoms were reported regarding the EENT system.        

      Derm: No deficits noted. No signs and/or symptoms reported regarding the dermatologic       

      system. Skin is pink, warm \T\ dry. Musculoskeletal: No deficits noted. No signs and/or     

      symptoms reported regarding the musculoskeletal system.                                     

21:35 Reassessment: Patient appears in no apparent distress at this time. No changes from     sf  

      previously documented assessment. Patient and/or family updated on plan of care and         

      expected duration. Pain level reassessed. Patient is alert, oriented x 3, equal             

      unlabored respirations, skin warm/dry/pink.                                                 

                                                                                                  

Vital Signs:                                                                                      

14:26  / 119; Pulse 87; Resp 18; Temp 98.6; Pulse Ox 97% ; Weight 113.4 kg; Height 5    ll1 

      ft. 10 in. (177.80 cm); Pain 4/10;                                                          

20:19  / 109; Pulse 68; Resp 16; Pulse Ox 99% ;                                         sf  

20:40  / 111; Pulse 66; Resp 16; Pulse Ox 99% ;                                         sf  

21:00  / 99; Pulse 69; Resp 16; Pulse Ox 100% ;                                         sf  

21:20  / 104; Pulse 62; Resp 16; Pulse Ox 100% ;                                        sf  

21:40  / 115; Pulse 62; Resp 16; Pulse Ox 99% ;                                         sf  

22:00  / 120; Pulse 65; Resp 16; Pulse Ox 99% ;                                         sf  

14:26 Body Mass Index 35.87 (113.40 kg, 177.80 cm)                                            ll1 

                                                                                                  

ED Course:                                                                                        

14:18 Patient arrived in ED.                                                                  ds1 

14:28 Triage completed.                                                                       ll1 

14:29 Arm band placed on.                                                                     ll1 

18:29 XRAY CXR (1 view) In Process Unspecified.                                               EDMS

19:24 Hudson Piper MD is Attending Physician.                                             janie 

19:24 Bobby Story RN is Primary Nurse.                                               sf  

19:27 Inserted saline lock: 20 gauge in right antecubital area, using aseptic technique.      jp3 

      Blood collected.                                                                            

19:28 First set of blood cultures drawn by me.                                                jp3 

19:38 Initial lab(s) drawn, by me, sent to lab. Second set of blood cultures drawn by me. EKG jp3 

      done, by ED staff, reviewed by Hudson Piper MD. COVID swab sent to lab. Patient           

      maintains SpO2 saturation greater than 95% on room air.                                     

20:10 Patient has correct armband on for positive identification. Bed in low position. Call   sf  

      light in reach. Side rails up X2. Cardiac monitor on. Pulse ox on. NIBP on. Door            

      closed. Noise minimized. Visitors limited. Lights dimmed. Verbal reassurance given.         

20:29 Thompson Santana MD is Hospitalizing Provider.                                           janie 

21:55 No provider procedures requiring assistance completed. Urine collected: clean catch       

      specimen, clear. Patient admitted, IV remains in place.                                     

22:23 Report given to BRIAN Donahue.                                                              sf  

                                                                                             

07:40 Primary Nurse role handed off by Bobby Story RN                                bd  

                                                                                                  

Administered Medications:                                                                         

                                                                                             

20:15 Drug: NS 0.9% 1000 ml Route: IV; Rate: 125 ml/hr; Site: right antecubital;              sf  

22:23 Follow up: IV Status: Infusion continued upon admission                                 sf  

20:23 Drug: Aspirin Chewable Tablet 324 mg Route: PO;                                         sf  

21:34 Follow up: Response: No adverse reaction                                                sf  

20:23 Drug: Pepcid (famotidine) 20 mg Route: IVP; Site: right antecubital;                    sf  

21:33 Follow up: Response: No adverse reaction                                                sf  

20:24 Drug: Lopressor (metoprolol TARTRATE) 50 mg Route: PO;                                  sf  

21:34 Follow up: Response: No adverse reaction                                                sf  

20:26 Drug: Lovenox (enoxaparin) 100 mg Route: Sub-Q; Site: right lower abdomen;              sf  

21:34 Follow up: Response: No adverse reaction                                                sf  

21:33 Drug: Norvasc (amlodipine) 10 mg Route: PO;                                               

                                                                                                  

                                                                                                  

Outcome:                                                                                          

20:31 Decision to Hospitalize by Provider.                                                    janie 

21:55 Admitted to ER Hold.  Please see [facility] for further documentation.                    sf  

21:55 Condition: stable                                                                           

21:55 Instructed on the need for admit.                                                           

                                                                                             

09:33 Patient left the ED.                                                                    tw2 

                                                                                                  

Signatures:                                                                                       

Dispatcher [employer]                           EDMS                                                 

Deya Mayen Corey, MD MD cha Sanford, Demi                                ds1                                                  

Marlene España RN                          RN   tw2                                                  

Mata Ibrahim                              3                                                  

Tisha Caro RN                       RN   ll1                                                  

Bobby Story RN RN   sf                                                   

                                                                                                  

Corrections: (The following items were deleted from the chart)                                    

                                                                                             

21:34 21:33 Response: No adverse reaction mikey jensen  

                                                                                                  

**************************************************************************************************

## 2021-04-20 NOTE — P.DS
Discharge Date: 04/20/21


Primary Care Provider: Kenneth


Disposition: ROUTINE DISCHARGE


Discharge Condition: GOOD


Reason for Admission: chest pain 


Consultations: 





Cardiologist


Brief History of Present Illness: 





Mr. Duarte is a 50 yo male who presents with chest pain beginning last night. He 

says he was having troubling sleeping when he had onset of 5/10 sternal chest 

tightness and difficulty breathing lasting for a few hours, improved with 

aspirin, sitting up and walking around. In the morning, he says he still felt 

'off' so he checked his BP and it was higher than he has ever seen it, SBP 

~150s. These symptoms last occurred 7 mo ago when he went to urgent care for 

chest pain, dizziness, and lightheadedness. He had a stress test at the time 

that was wnl. He followed up with cardiology and was told he might need more 

invasive testing. He believes the pain is triggered by stress. Denies 

palpitations, nausea, vomiting, diaphoresis, blurry vision. Initial trops 

negative. 


Hospital Course: 





Patient seen by Cardiology and they recommended beta-blocker and fenofibrate 

therapy.  Patient clinically is doing well.  At this time, patient is stable for

discharge with outpatient follow with Cardiology in 1-2 weeks.


Vital Signs/Physical Exam: 














Temp Pulse Resp BP Pulse Ox


 


 98.3 F   63   17   143/101 H  99 


 


 04/20/21 04:00  04/20/21 08:00  04/20/21 08:00  04/20/21 08:00  04/20/21 08:00








General: Alert, In no apparent distress, Oriented x3


Laboratory Data at Discharge: 














WBC  5.30 K/uL (4.3-10.9)   04/20/21  04:36    


 


Hgb  16.7 g/dL (13.6-17.9)   04/20/21  04:36    


 


Hct  48.1 % (39.6-49.0)   04/20/21  04:36    


 


Plt Count  129 K/uL (152-406)  L  04/20/21  04:36    


 


PT  11.1 SECONDS (9.5-12.5)   04/19/21  19:37    


 


INR  0.97   04/19/21  19:37    


 


APTT  30.5 SECONDS (24.3-36.9)   04/19/21  19:37    


 


Sodium  139 mmol/L (136-145)   04/20/21  04:36    


 


Potassium  3.9 mmol/L (3.5-5.1)   04/20/21  04:36    


 


BUN  14 mg/dL (7-18)   04/20/21  04:36    


 


Creatinine  0.86 mg/dL (0.55-1.3)   04/20/21  04:36    


 


Glucose  126 mg/dL ()  H  04/20/21  04:36    


 


Phosphorus  2.1 mg/dL (2.5-4.9)  L  04/20/21  04:36    


 


Magnesium  2.4 mg/dL (1.8-2.4)   04/20/21  04:36    


 


Total Bilirubin  1.6 mg/dL (0.2-1.0)  H  04/20/21  04:36    


 


AST  28 U/L (15-37)   04/20/21  04:36    


 


ALT  62 U/L (12-78)   04/20/21  04:36    


 


Alkaline Phosphatase  93 U/L ()   04/20/21  04:36    


 


Troponin I  < 0.02 ng/mL (0.0-0.045)   04/20/21  04:36    


 


Triglycerides  519 mg/dL (<150)  H  04/20/21  04:36    


 


Cholesterol  141 mg/dL (<200)   04/20/21  04:36    


 


LDL Cholesterol Direct  70 mg/dL (100-129)  L  04/20/21  04:36    


 


HDL Cholesterol  26 mg/dL (40-60)  L  04/20/21  04:36    


 


Cholesterol/HDL Ratio  5.42   04/20/21  04:36    


 


Lipase  82 U/L ()   04/19/21  19:37    








Home Medications: 








Amlodipine [Norvasc*] 5 mg PO DAILY #30 tab 04/20/21 


Aspirin [Aspirin EC 81 MG] 81 mg PO DAILY #30 tablet. 04/20/21 


Atorvastatin Calcium [Lipitor*] 20 mg PO BEDTIME #30 tab 04/20/21 


Levothyroxine [Synthroid*] 0.075 mg PO DAILYAC #30 tab 04/20/21 


Metoprolol Tartrate [Lopressor*] 25 mg PO BID 6AM 6PM #60 tab 04/20/21 


clonazePAM [Klonopin] 0.5 mg PO TID PRN #30 tablet 04/20/21 





New Medications: 


Aspirin [Aspirin EC 81 MG] 81 mg PO DAILY #30 tablet.


clonazePAM [Klonopin] 0.5 mg PO TID PRN #30 tablet


 PRN Reason: Anxiety


Atorvastatin Calcium [Lipitor*] 20 mg PO BEDTIME #30 tab


Metoprolol Tartrate [Lopressor*] 25 mg PO BID 6AM 6PM #60 tab


Amlodipine [Norvasc*] 5 mg PO DAILY #30 tab


Levothyroxine [Synthroid*] 0.075 mg PO DAILYAC #30 tab


Physician Discharge Instructions: 


OK TO DC IV AND DC HOME


FOLLOW-UP WITH PRIMARY CARE PROVIDER IN 1-2 WEEKS


FOLLOW-UP WITH CARDIOLOGY IN 1-2 WEEKS


RETURN TO THE ER IF symptoms worsen


CALL or TEXT DR. AYALA [phone] IF ANY QUESTIONS REGARDING HOSPITAL STAY.


PLEASE CALL THE FLOOR -[phone] IF ANY MEDICATION OR NURSING QUESTIONS.


Diet: AHA


Activity: Fall precautions


Followup: 


Vitaliy Castillo MD [ACTIVE - CAN ADMIT] - 


Eric Cooper DO [Primary Care Provider] - 


Time spent managing pt's care (in minutes): 30

## 2021-04-20 NOTE — EKG
Test Date:    2021-04-20               Test Time:    01:04:16

Technician:   VESNA                                     

                                                     

MEASUREMENT RESULTS:                                       

Intervals:                                           

Rate:         58                                     

GA:           160                                    

QRSD:         94                                     

QT:           430                                    

QTc:          422                                    

Axis:                                                

P:            54                                     

GA:           160                                    

QRS:          -8                                     

T:            27                                     

                                                     

INTERPRETIVE STATEMENTS:                                       

                                                     

Sinus bradycardia

Otherwise normal ECG

Compared to ECG 11/30/2017 03:44:48

Sinus rhythm no longer present



Electronically Signed On 04-20-21 12:13:14 CDT by Vitaliy Castillo

## 2021-04-20 NOTE — P.HP
Certification for Inpatient


Patient admitted to: Observation


With expected LOS: <2 Midnights


Patient will require the following post-hospital care: None


Practitioner: I am a practitioner with admitting privileges, knowledge of 

patient current condition, hospital course, and medical plan of care.


Services: Services provided to patient in accordance with Admission requirements

found in Title 42 Section 412.3 of the Code of Federal Regulations





Patient History


Date of Service: 04/20/21


Primary Care Provider: Kenneth


Reason for admission: chest pain 


History of Present Illness: 


Mr. Duarte is a 50 yo male who presents with chest pain beginning last night. He 

says he was having troubling sleeping when he had onset of 5/10 sternal chest 

tightness and difficulty breathing lasting for a few hours, improved with 

aspirin, sitting up and walking around. In the morning, he says he still felt 

'off' so he checked his BP and it was higher than he has ever seen it, SBP 

~150s. These symptoms last occurred 7 mo ago when he went to urgent care for 

chest pain, dizziness, and lightheadedness. He had a stress test at the time 

that was wnl. He followed up with cardiology and was told he might need more 

invasive testing. He believes the pain is triggered by stress. Denies 

palpitations, nausea, vomiting, diaphoresis, blurry vision. Initial trops 

negative. 


Allergies





No Known Allergies Allergy (Verified 04/20/21 01:46)


   








- Past Medical/Surgical History


Has patient received pneumonia vaccine in the past: No


Diabetic: No


Past Medical History: Patient denies medical history


Past Surgical History: Patient denies surgical history





- Family History


  ** Mother


-: Heart disease, Hypertension





  ** Father


-: Cancer





  ** Brother


Notes: cerebral palsy





- Social History


Smoking Status: Never smoker


Alcohol use: Yes


CD- Drugs: No


Caffeine use: Yes


Place of Residence: Home





Review of Systems


General: Unremarkable


Eyes: Unremarkable


ENT: Unremarkable


Respiratory: Shortness of Breath, As per HPI


Cardiovascular: Chest Pain, As per HPI


Gastrointestinal: Unremarkable


Genitourinary: Unremarkable


Musculoskeletal: Unremarkable


Integumentary: Unremarkable


Neurological: Unremarkable


Lymphatics: Unremarkable





Physical Examination





- Physical Exam


General: Alert, In no apparent distress, Oriented x3, Cooperative


HEENT: Atraumatic, Normocephalic, PERRLA, Mucous membr. moist/pink, EOMI, Sc

lerae nonicteric


Neck: Supple, 2+ carotid pulse no bruit, JVD not distended, No Thyromegaly


Respiratory: Clear to auscultation bilaterally, Normal air movement


Cardiovascular: No edema, Normal pulses, Regular rate/rhythm, Normal S1 S2, No 

gallops, No rubs, No murmurs


Capillary refill: <2 Seconds


Gastrointestinal: Normal bowel sounds, Soft and benign, Non-distended, No 

ascites, No tenderness, No masses, No rebound, No guarding


Musculoskeletal: No clubbing, No swelling, No contractures, No erythema, No 

tenderness, No warmth


Integumentary: No rashes, No breakdown, No significant lesion, No 

tenderness/swelling, No erythema, No warmth, No cyanosis


Neurological: Normal gait, Normal speech, Normal strength at 5/5 x4 extr, Normal

tone, Sensation intact, Cranial nerves 3-12 intact, Normal affect


Lymphatics: No axilla or inguinal lymphadenopathy





- Studies


Laboratory Data (last 24 hrs)





04/19/21 19:37: PT 11.1, INR 0.97, APTT 30.5


04/19/21 19:37: WBC 5.70, Hgb 17.2, Hct 49.5 H, Plt Count 149 L


04/19/21 19:37: Sodium 141, Potassium 4.1, BUN 19 H, Creatinine 0.96, Glucose 

116 H, Magnesium 2.4, Total Bilirubin 1.5 H, AST 28, ALT 66, Alkaline 

Phosphatase 105, Lipase 82








Assessment and Plan





- Problems (Diagnosis)


(1) Chest pain


Current Visit: Yes   Status: Acute   


Qualifiers: 


   Chest pain type: unspecified   Qualified Code(s): R07.9 - Chest pain, 

unspecified   





- Plan





cardiology consulted


ASA, BB, statin, DVT ppx


PRN morphine and NTG


lipid panel, A1c pending


TSH 4.623, T4 0.72, initiated levothyroxine


dietitian consulted 


Discharge Plan: Home


Plan to discharge in: 24 Hours





- Advance Directives


Does patient have a Living Will: No


Does patient have a Durable POA for Healthcare: No





- Code Status/Comfort Care


Code Status Assessed: Yes (full code)


Critical Care: No


Time Spent Managing Pts Care (In Minutes): 70

## 2021-04-20 NOTE — EKG
Test Date:    2021-04-19               Test Time:    14:34:35

Technician:   KYLIEL                                    

                                                     

MEASUREMENT RESULTS:                                       

Intervals:                                           

Rate:         89                                     

AK:           152                                    

QRSD:         84                                     

QT:           360                                    

QTc:          438                                    

Axis:                                                

P:            41                                     

AK:           152                                    

QRS:          -34                                    

T:            1                                      

                                                     

INTERPRETIVE STATEMENTS:                                       

                                                     

Normal sinus rhythm

Left axis deviation

Possible Anterior infarct, age undetermined

Abnormal ECG

Compared to ECG 11/30/2017 03:44:48

Left-axis deviation now present

Myocardial infarct finding now present



Electronically Signed On 04-20-21 12:13:32 CDT by Vitaliy Castillo

## 2021-04-20 NOTE — CON
Date of Consultation:  04/20/2021



Reason For Consultation:  Chest pain.



History Of Present Illness:  Mr. Duarte is a 51-year-old male, who has awoken a couple of days ago with
 a sharp, stabbing right shoulder and right chest pain with shortness of breath that has persisted fo
r 48 hours.  Has been seen at [facility] with negative workup, recently seeing a cardiologist and he has h
ad a negative echo and negative stress test.  He is under a lot of stress and feels himself that he h
as a panic disorder attack at this point.  He has already had a normal EKG, normal x-ray, normal trop
onin, normal BNP.  Continues to have some symptoms mostly with movement.  He denied PND, orthopnea, p
edal edema, palpitations, or syncope.



Past Medical History:  Includes irregular heartbeat, no specific diagnosis.



Allergies:  NONE.



Medications:  At home are none.



Review of Systems:

Negative.



Social History:  Negative.



Family History:  Negative.



Physical Examination:

Vital Signs:  Stable, afebrile. 

HEENT:  Negative. 

Neck:  Supple with no bruit. 

Chest:  Clear. 

Cardiac:  Revealed a regular rhythm and rate.  No murmurs, gallops, or rubs. 

Abdomen:  Benign. 

Extremities:  Revealed no clubbing, cyanosis, or edema.



Diagnostic Data:  All normal except the triglycerides of 519.



Impression And Plan:  

1.Atypical chest pain, most likely related to panic disorder.

2.Hypertriglyceridemia.

3.Palpitations.  I will consider low-dose beta blocker, low-dose fenofibrate, and follow up with his
 primary care physician.  I will be happy to see him in the office in the next 2 weeks.  No further c
ardiac workup recommended at this point.  He can go home whenever it is okay with Dr. Santana.





NB/JUANL

DD:  04/20/2021 09:48:09Voice ID:  [id number]

DT:  04/20/2021 12:18:38Report ID:  [id number]

## 2021-05-19 NOTE — P.PN
Date of Service: 05/19/21





Patient called me requesting refills of his medications.  These were called in 

to the local pharmacy.  Did inform the patient would no longer be of the call 

and any further refills.  He will need to follow with his primary care provider 

as well as his cardiologist.

## 2023-06-17 NOTE — ER
Nurse's Notes                                                                                     

 [facility]                                                                 

Name: Danny Duarte                                                                                   

Age: 50 yrs                                                                                       

Sex: Male                                                                                         

: 1970                                                                                   

MRN: [id number]                                                                                   

Arrival Date: 2020                                                                          

Time: :                                                                                       

Account#: [id number]                                                                            

Bed 14                                                                                            

Private MD:                                                                                       

Diagnosis: Pain in right hip;Pain in right knee                                                   

                                                                                                  

Presentation:                                                                                     

                                                                                             

09:29 Chief complaint: Patient states: Right hip pain that began 2-3 days ago. Pt states "I   aa5 

      was having trouble with my well and I was putting a new pump motor when I felt my hip       

      pop".                                                                                       

09:29 Coronavirus screen: Client denies travel out of the [place] in the last 14 days. At this   aa5 

      time, the client does not indicate any symptoms associated with coronavirus-19. Ebola       

      Screen: Patient negative for fever greater than or equal to 101.5 degrees Fahrenheit,       

      and additional compatible Ebola Virus Disease symptoms. Initial Sepsis Screen: Does the     

      patient meet any 2 criteria? No. Patient's initial sepsis screen is negative. Does the      

      patient have a suspected source of infection? No. Patient's initial sepsis screen is        

      negative. Risk Assessment: Do you want to hurt yourself or someone else? Patient            

      reports no desire to harm self or others. Onset of symptoms was .                       

09:29 Acuity: CHRIS 4                                                                           aa5 

09:29 Method Of Arrival: Ambulatory                                                           aa5 

                                                                                                  

Historical:                                                                                       

- Allergies:                                                                                      

09:30 No Known Allergies;                                                                     aa5 

- PMHx:                                                                                           

09:30 Heart arrhythmias;                                                                      aa5 

- PSHx:                                                                                           

09:30 None;                                                                                   aa5 

                                                                                                  

- Immunization history:: Adult Immunizations unknown.                                             

- Social history:: Smoking status: Patient denies any tobacco usage or history of.                

                                                                                                  

                                                                                                  

Screening:                                                                                        

10:00 Abuse screen: Denies threats or abuse. Denies injuries from another. Nutritional        ca1 

      screening: No deficits noted. Tuberculosis screening: No symptoms or risk factors           

      identified. Fall Risk None identified.                                                      

                                                                                                  

Assessment:                                                                                       

10:00 General: Appears in no apparent distress. comfortable, Behavior is calm, cooperative,   ca1 

      appropriate for age. Pain: Complains of pain in right hip Pain currently is 7 out of 10     

      on a pain scale. Pain began 2-3 days ago. Aggravated by weight bearing. Neuro: Level of     

      Consciousness is awake, alert, obeys commands, Oriented to person, place, time,             

      situation. Derm: Skin is intact, is healthy with good turgor, Skin is pink, warm \T\ dry.   

      Musculoskeletal: Circulation, motion, and sensation intact. Capillary refill < 3            

      seconds.                                                                                    

10:46 Reassessment: Patient appears in no apparent distress at this time. Patient is alert,   ca1 

      oriented x 3, equal unlabored respirations, skin warm/dry/pink.                             

                                                                                                  

Vital Signs:                                                                                      

09:29  / 99; Pulse 87; Resp 16 S; Temp 98.5(O); Pulse Ox 98% on R/A; Weight 113.4 kg    aa5 

      (R); Height 5 ft. 10 in. (177.80 cm) (R); Pain 7/10;                                        

10:46  / 88; Pulse 81; Resp 15 S; Pulse Ox 99% on R/A;                                  ca1 

09:29 Body Mass Index 35.87 (113.40 kg, 177.80 cm)                                            aa5 

                                                                                                  

ED Course:                                                                                        

09:22 Patient arrived in ED.                                                                  ag5 

09:29 Arm band placed on Patient placed in an exam room, on a stretcher.                      aa5 

09:33 Hudson Lizama PA is PHCP.                                                                cp  

09:33 Jason Worley MD is Attending Physician.                                              cp  

09:51 Triage completed.                                                                       aa5 

10:00 Patient has correct armband on for positive identification. Bed in low position. Call   ca1 

      light in reach. Side rails up X 1. Pulse ox on. NIBP on. Warm blanket given.                

10:11 XRAY Pelvis In Process Unspecified.                                                     EDMS

10:12 XRAY Hip RIGHT 2 view In Process Unspecified.                                           EDMS

10:12 XRAY Knee RIGHT 3 view In Process Unspecified.                                          EDMS

10:14 Chel Montoya, BRIAN is Primary Nurse.                                                      ca1 

10:35 Bobby Andujar MD is Referral Physician.                                            cp  

10:46 No provider procedures requiring assistance completed. Patient did not have IV access   ca1 

      during this emergency room visit.                                                           

                                                                                                  

Administered Medications:                                                                         

No medications were administered                                                                  

                                                                                                  

                                                                                                  

Outcome:                                                                                          

10:36 Discharge ordered by MD.                                                                cp  

10:46 Discharged to home ambulatory.                                                          ca1 

10:46 Condition: stable                                                                           

10:46 Discharge instructions given to patient, Instructed on discharge instructions, follow       

      up and referral plans. no drinking with medication, no driving heavy equipment,             

      medication usage, Demonstrated understanding of instructions, follow-up care,               

      medications, Prescriptions given X 3.                                                       

10:50 Patient left the ED.                                                                    ca1 

                                                                                                  

Signatures:                                                                                       

Dispatcher [employer]                           Akanksha Díaz, RN                     RN   aa5                                                  

Hudson Lizama PA PA cp Acob, Cheryl, RN                        RN   ca1                                                  

Oneil Tan                                ag5                                                  

                                                                                                  

************************************************************************************************** verbal cues